# Patient Record
Sex: MALE | Race: WHITE | NOT HISPANIC OR LATINO | Employment: FULL TIME | URBAN - METROPOLITAN AREA
[De-identification: names, ages, dates, MRNs, and addresses within clinical notes are randomized per-mention and may not be internally consistent; named-entity substitution may affect disease eponyms.]

---

## 2019-08-06 ENCOUNTER — TELEPHONE (OUTPATIENT)
Dept: UROLOGY | Facility: CLINIC | Age: 44
End: 2019-08-06

## 2019-08-06 NOTE — TELEPHONE ENCOUNTER
Message left for pt on voicemail informing pt appt needed to be rescheduled.  would not be available. Please call back to r/s appt. Contact info given.

## 2019-08-22 ENCOUNTER — OFFICE VISIT (OUTPATIENT)
Dept: UROLOGY | Facility: CLINIC | Age: 44
End: 2019-08-22
Payer: COMMERCIAL

## 2019-08-22 VITALS
HEIGHT: 71 IN | BODY MASS INDEX: 20.99 KG/M2 | WEIGHT: 149.94 LBS | HEART RATE: 62 BPM | SYSTOLIC BLOOD PRESSURE: 135 MMHG | DIASTOLIC BLOOD PRESSURE: 84 MMHG

## 2019-08-22 DIAGNOSIS — E29.1 TESTICULAR FAILURE: Primary | ICD-10-CM

## 2019-08-22 PROCEDURE — 99204 PR OFFICE/OUTPT VISIT, NEW, LEVL IV, 45-59 MIN: ICD-10-PCS | Mod: S$GLB,,, | Performed by: UROLOGY

## 2019-08-22 PROCEDURE — 3008F BODY MASS INDEX DOCD: CPT | Mod: CPTII,S$GLB,, | Performed by: UROLOGY

## 2019-08-22 PROCEDURE — 3008F PR BODY MASS INDEX (BMI) DOCUMENTED: ICD-10-PCS | Mod: CPTII,S$GLB,, | Performed by: UROLOGY

## 2019-08-22 PROCEDURE — 99204 OFFICE O/P NEW MOD 45 MIN: CPT | Mod: S$GLB,,, | Performed by: UROLOGY

## 2019-08-22 PROCEDURE — 99999 PR PBB SHADOW E&M-EST. PATIENT-LVL III: CPT | Mod: PBBFAC,,, | Performed by: UROLOGY

## 2019-08-22 PROCEDURE — 99999 PR PBB SHADOW E&M-EST. PATIENT-LVL III: ICD-10-PCS | Mod: PBBFAC,,, | Performed by: UROLOGY

## 2019-08-22 NOTE — PROGRESS NOTES
"Chief Complaint:  Infertility    HPI:    Mr. Babrer is a 43 y.o.  male who has been  to his wife for the past 2 years. They have been trying to achieve a pregnancy for the past 1 years but without success. Edson Barber has not undergone a semen analysis. He denies a history of erectile dysfunction and ejaculatory problems.    He has achieved 0 pregnancies in the past.    Rosa Barber is 37 years old. ( 81) Her menses are regular. She has not undergone prior hysterosalpingogram. She has achieved 1 prior pregnancies.  She sees Martita Jordan NP.    The couple has not undergone prior intrauterine insemination procedures.    The couple has not undergone prior in-vitro fertilization procedures.    Edson Barber denies a history of exposure to harmful chemicals, toxins, and radiation.    No history of recent fevers greater than 101.5 degrees Farenheit.    No history of recent exposure to "wet heat."    No history of urological trauma or testicular torsion.    No history of prostatitis, epididymitis, and orchitis.    No history of post-pubertal mumps.    There is no known family history of fertility problems.    REVIEW OF SYSTEMS:     He denies headache, blurred vision, fever, nausea, vomiting, chills, flank discomfort, abdominal pain, bleeding per rectum, cough, SOB, recent loss of consciousness, recent mental status changes, seizures, dizziness, or upper/lower extremity weakness.    PHYSICAL EXAM:     The patient generally appears in good health, is appropriately interactive, and is in no apparent distress.     Eyes: anicteric sclerae, moist conjunctivae; no lid-lag; PERRLA     HENT: Atraumatic; oropharynx clear with moist mucous membranes and no mucosal ulcerations;normal hard and soft palate.  No evidence of lymphadenopathy.    Neck: Trachea midline.  No thyromegaly.    Musculoskeletal: No abnormal gait.    Skin: No lesions.    Mental: Cooperative with normal affect.  Is oriented to time, place, " "and person.    Neuro: Grossly intact.    Chest: Normal inspiratory effort.   No accessory muscles.  No audible wheezes.  Respirations symmetric on inspiration and expiration.    Heart: Regular rhythm.      Abdomen:  Soft, non-tender. No masses or organomegaly. Bladder is not palpable. No evidence of flank discomfort. No evidence of inguinal hernia.    Genitourinary: Penis is normal with no evidence of plaques or induration. Urethral meatus is normal. Scrotum is normal. Testes are descended bilaterally with no evidence of abnormal masses or tenderness. Epididymis, vas deferens, and cord structures are normal bilaterally.  Testicular volume is approximately 7 cc bilaterally.    Extremities: No cyanosis, clubbing, or edema.    IMPRESSION & PLAN:      Mr. Barber is a 43 y.o.  male who has been  to his wife for the past 2 years. They have been trying to achieve a pregnancy for the past 1 years but without success. Edson Barber has not undergone a semen analysis. He denies a history of erectile dysfunction and ejaculatory problems.    He has achieved 0 pregnancies in the past.    1.  FSH, LH, testosterone, prolactin, and estradiol serum levels today.  2.  Semen analysis x 2.  3.  Return to the clinic in 3 weeks to discuss test results and treatment plan.  4.  Recommend avoiding "wet heat."  5.  Recommend taking a multivitamin and 500 mg of vitamin c daily in addition to the multivitamin.  6.  Please send a copy of the note to Julian.  Thank you for the consultation.    CC: Julian    "

## 2019-08-22 NOTE — LETTER
August 22, 2019        Martita Jordan, GAETANO  [address]  [address]  [facility] [address]             [facility] - Urology 4th Floor  [address]  [place] [address]  Phone: [phone]   Patient: Edson Barber   MR Number: [id number]   YOB: 1975   Date of Visit: 8/22/2019       Dear Dr. Jordan:    Thank you for referring Edson Barber to me for evaluation. Attached you will find relevant portions of my assessment and plan of care.    If you have questions, please do not hesitate to call me. I look forward to following Edson Barber along with you.    Sincerely,      Rebel Betancur MD            CC  No Recipients    Enclosure

## 2019-08-26 ENCOUNTER — TELEPHONE (OUTPATIENT)
Dept: UROLOGY | Facility: CLINIC | Age: 44
End: 2019-08-26

## 2019-08-26 ENCOUNTER — PATIENT MESSAGE (OUTPATIENT)
Dept: UROLOGY | Facility: CLINIC | Age: 44
End: 2019-08-26

## 2019-08-26 DIAGNOSIS — R79.89 ELEVATED PROLACTIN LEVEL: Primary | ICD-10-CM

## 2019-08-28 ENCOUNTER — TELEPHONE (OUTPATIENT)
Dept: ENDOCRINOLOGY | Facility: CLINIC | Age: 44
End: 2019-08-28

## 2019-08-28 DIAGNOSIS — E22.1 HYPERPROLACTINEMIA: Primary | ICD-10-CM

## 2019-09-05 ENCOUNTER — TELEPHONE (OUTPATIENT)
Dept: ENDOCRINOLOGY | Facility: CLINIC | Age: 44
End: 2019-09-05

## 2019-09-19 ENCOUNTER — OFFICE VISIT (OUTPATIENT)
Dept: OPTOMETRY | Facility: CLINIC | Age: 44
End: 2019-09-19
Payer: COMMERCIAL

## 2019-09-19 DIAGNOSIS — Z46.0 FITTING AND ADJUSTMENT OF SPECTACLES AND CONTACT LENSES: Primary | ICD-10-CM

## 2019-09-19 DIAGNOSIS — Z04.9 DISEASE RULED OUT AFTER EXAMINATION: ICD-10-CM

## 2019-09-19 DIAGNOSIS — H52.13 MYOPIA, BILATERAL: Primary | ICD-10-CM

## 2019-09-19 PROCEDURE — 99999 PR PBB SHADOW E&M-EST. PATIENT-LVL II: ICD-10-PCS | Mod: PBBFAC,,, | Performed by: OPTOMETRIST

## 2019-09-19 PROCEDURE — 92015 DETERMINE REFRACTIVE STATE: CPT | Mod: S$GLB,,, | Performed by: OPTOMETRIST

## 2019-09-19 PROCEDURE — 99999 PR PBB SHADOW E&M-EST. PATIENT-LVL I: CPT | Mod: PBBFAC,,, | Performed by: OPTOMETRIST

## 2019-09-19 PROCEDURE — 99999 PR PBB SHADOW E&M-EST. PATIENT-LVL II: CPT | Mod: PBBFAC,,, | Performed by: OPTOMETRIST

## 2019-09-19 PROCEDURE — 99999 PR PBB SHADOW E&M-EST. PATIENT-LVL I: ICD-10-PCS | Mod: PBBFAC,,, | Performed by: OPTOMETRIST

## 2019-09-19 PROCEDURE — 92004 COMPRE OPH EXAM NEW PT 1/>: CPT | Mod: S$GLB,,, | Performed by: OPTOMETRIST

## 2019-09-19 PROCEDURE — 92015 PR REFRACTION: ICD-10-PCS | Mod: S$GLB,,, | Performed by: OPTOMETRIST

## 2019-09-19 PROCEDURE — 92004 PR EYE EXAM, NEW PATIENT,COMPREHESV: ICD-10-PCS | Mod: S$GLB,,, | Performed by: OPTOMETRIST

## 2019-09-19 PROCEDURE — 92310 CONTACT LENS FITTING OU: CPT | Mod: CSM,,, | Performed by: OPTOMETRIST

## 2019-09-19 PROCEDURE — 92310 PR CONTACT LENS FITTING (NO CHANGE): ICD-10-PCS | Mod: CSM,,, | Performed by: OPTOMETRIST

## 2019-09-19 NOTE — PROGRESS NOTES
HPI     Last eye exam was approximately 1 years ago.      Pt here for annual and cl fit  Pt states no changes in va  Last optometrits does not cover ins. Needs new drJacinto  Patient denies diplopia, headaches, flashes/floaters, pain, and   itching/burning/tearing.    Pt does not use any eye drops.        Last edited by Fabby Lindsey on 9/19/2019  8:48 AM. (History)            Assessment /Plan     For exam results, see Encounter Report.    Myopia, bilateral    Disease ruled out after examination      Rx specs  CL fit today: dispensed trials of oasys -2.00 OU  Remove nightly, replace 2 weeks  Call if need to go back to -2.25    (+) family history of mac degen in grandmother  Good overall ocular health today, monitor yearly    RTC 1 year

## 2019-09-25 ENCOUNTER — LAB VISIT (OUTPATIENT)
Dept: LAB | Facility: HOSPITAL | Age: 44
End: 2019-09-25
Attending: INTERNAL MEDICINE
Payer: COMMERCIAL

## 2019-09-25 DIAGNOSIS — E22.1 HYPERPROLACTINEMIA: ICD-10-CM

## 2019-09-25 LAB
ALBUMIN SERPL BCP-MCNC: 4.3 G/DL (ref 3.5–5.2)
ALP SERPL-CCNC: 38 U/L (ref 55–135)
ALT SERPL W/O P-5'-P-CCNC: 12 U/L (ref 10–44)
ANION GAP SERPL CALC-SCNC: 10 MMOL/L (ref 8–16)
AST SERPL-CCNC: 15 U/L (ref 10–40)
BILIRUB SERPL-MCNC: 0.4 MG/DL (ref 0.1–1)
BUN SERPL-MCNC: 16 MG/DL (ref 6–20)
CALCIUM SERPL-MCNC: 9.8 MG/DL (ref 8.7–10.5)
CHLORIDE SERPL-SCNC: 106 MMOL/L (ref 95–110)
CO2 SERPL-SCNC: 25 MMOL/L (ref 23–29)
CORTIS SERPL-MCNC: 18.1 UG/DL (ref 4.3–22.4)
CREAT SERPL-MCNC: 1.1 MG/DL (ref 0.5–1.4)
EST. GFR  (AFRICAN AMERICAN): >60 ML/MIN/1.73 M^2
EST. GFR  (NON AFRICAN AMERICAN): >60 ML/MIN/1.73 M^2
FSH SERPL-ACNC: 43 MIU/ML (ref 0.95–11.95)
GLUCOSE SERPL-MCNC: 101 MG/DL (ref 70–110)
LH SERPL-ACNC: 12.7 MIU/ML (ref 0.6–12.1)
POTASSIUM SERPL-SCNC: 5 MMOL/L (ref 3.5–5.1)
PROLACTIN SERPL IA-MCNC: 12.6 NG/ML (ref 3.5–19.4)
PROT SERPL-MCNC: 7.8 G/DL (ref 6–8.4)
SODIUM SERPL-SCNC: 141 MMOL/L (ref 136–145)
T4 FREE SERPL-MCNC: 1.1 NG/DL (ref 0.71–1.51)
TESTOST SERPL-MCNC: 433 NG/DL (ref 304–1227)
TSH SERPL DL<=0.005 MIU/L-ACNC: 1.37 UIU/ML (ref 0.4–4)

## 2019-09-25 PROCEDURE — 82533 TOTAL CORTISOL: CPT

## 2019-09-25 PROCEDURE — 83001 ASSAY OF GONADOTROPIN (FSH): CPT

## 2019-09-25 PROCEDURE — 36415 COLL VENOUS BLD VENIPUNCTURE: CPT

## 2019-09-25 PROCEDURE — 84403 ASSAY OF TOTAL TESTOSTERONE: CPT

## 2019-09-25 PROCEDURE — 84443 ASSAY THYROID STIM HORMONE: CPT

## 2019-09-25 PROCEDURE — 84305 ASSAY OF SOMATOMEDIN: CPT

## 2019-09-25 PROCEDURE — 83002 ASSAY OF GONADOTROPIN (LH): CPT

## 2019-09-25 PROCEDURE — 80053 COMPREHEN METABOLIC PANEL: CPT

## 2019-09-25 PROCEDURE — 84146 ASSAY OF PROLACTIN: CPT

## 2019-09-25 PROCEDURE — 82024 ASSAY OF ACTH: CPT

## 2019-09-25 PROCEDURE — 84439 ASSAY OF FREE THYROXINE: CPT

## 2019-09-27 LAB — ACTH PLAS-MCNC: 28 PG/ML (ref 0–46)

## 2019-10-01 LAB
IGF-I SERPL-MCNC: 170 NG/ML (ref 44–275)
IGF-I Z-SCORE SERPL: 0.67 SD

## 2019-10-08 ENCOUNTER — OFFICE VISIT (OUTPATIENT)
Dept: ENDOCRINOLOGY | Facility: CLINIC | Age: 44
End: 2019-10-08
Payer: COMMERCIAL

## 2019-10-08 VITALS
DIASTOLIC BLOOD PRESSURE: 86 MMHG | BODY MASS INDEX: 20.97 KG/M2 | HEART RATE: 54 BPM | WEIGHT: 150.38 LBS | TEMPERATURE: 98 F | SYSTOLIC BLOOD PRESSURE: 127 MMHG

## 2019-10-08 DIAGNOSIS — E22.1 HYPERPROLACTINEMIA: Primary | ICD-10-CM

## 2019-10-08 DIAGNOSIS — E29.1 PRIMARY HYPOGONADISM IN MALE: ICD-10-CM

## 2019-10-08 DIAGNOSIS — N62 GYNECOMASTIA: ICD-10-CM

## 2019-10-08 PROCEDURE — 99999 PR PBB SHADOW E&M-EST. PATIENT-LVL III: ICD-10-PCS | Mod: PBBFAC,,, | Performed by: INTERNAL MEDICINE

## 2019-10-08 PROCEDURE — 99999 PR PBB SHADOW E&M-EST. PATIENT-LVL III: CPT | Mod: PBBFAC,,, | Performed by: INTERNAL MEDICINE

## 2019-10-08 PROCEDURE — 99244 PR OFFICE CONSULTATION,LEVEL IV: ICD-10-PCS | Mod: S$GLB,,, | Performed by: INTERNAL MEDICINE

## 2019-10-08 PROCEDURE — 99244 OFF/OP CNSLTJ NEW/EST MOD 40: CPT | Mod: S$GLB,,, | Performed by: INTERNAL MEDICINE

## 2019-10-08 RX ORDER — ASCORBIC ACID 500 MG
500 TABLET ORAL DAILY
COMMUNITY

## 2019-10-08 NOTE — Clinical Note
Thank you for sending him our way. This is a bit unusual with elevated prolactin and LH/FSH which is not typical pattern so I am doing some further evaluation. I also wanted to confirm you found nothing concerning on testicular exam. He told me he passed out and preferred to defer this exam again with meThank david,Nanda

## 2019-10-08 NOTE — PROGRESS NOTES
Edson Barber is a 43 y.o. male referred by Dr. Rebel Betancur for evaluation of hyperprolactinemia, elevated LH/FSH in setting of infertility    History of Present Illness  For the past year and a half he and his wife have been trying to conceive without success  She has child from previous relationship, he has no children  Seen by Dr. Betancur who did evaluation which revealed elevated prolactin of 100 along with low-normal testosterone with elevated LH/FSH (at 1700)  Semen analysis deferred until after this visit    Normal pubertal development  Shaves 7  times a week, unchanged  Reproductive history: no children    Libido: normal.  Fatigue: some with work but not unexpected.  Erections: normal  Body hair: unchanged.  Muscle strength: normal.  Sense of smell:normal  Sleep apnea:denies  Liver disease:denies    Headaches: denies  Loss of peripheral vision: denies  Galactorrhea: denies  Some breast tenderness over past month left breast  Denies excess breast tissue    He denies use of anabolic steroids/testosterone, pelvic trauma or surgery, head trauma    Notes he is very squeamish, had LOC during recent testicular exam            Current Outpatient Medications:     ascorbic acid, vitamin C, (VITAMIN C) 500 MG tablet, Take 500 mg by mouth once daily., Disp: , Rfl:     multivitamin capsule, Take 1 capsule by mouth once daily., Disp: , Rfl:     Review of Systems   Constitutional: Negative for fever and unexpected weight change.   Eyes: Negative for pain.   Respiratory: Negative for shortness of breath.    Cardiovascular: Negative for chest pain and leg swelling.   Gastrointestinal: Negative for abdominal pain.   Genitourinary: Negative for dysuria.   Musculoskeletal: Negative for arthralgias.   Skin: Negative for rash.   Neurological: Negative for headaches.   Psychiatric/Behavioral: Negative for confusion.       Objective:     Vitals:    10/08/19 1325   BP: 127/86   Pulse: (!) 54   Temp: 97.8 °F (36.6 °C)     Wt  Readings from Last 3 Encounters:   10/08/19 68.2 kg (150 lb 5.7 oz)   08/22/19 68 kg (149 lb 14.6 oz)     Physical Exam   Constitutional: He is oriented to person, place, and time. He appears well-developed and well-nourished.   HENT:   Head: Normocephalic.   Right Ear: External ear normal.   Left Ear: External ear normal.   Mouth/Throat: Oropharynx is clear and moist.   Eyes: Pupils are equal, round, and reactive to light. Conjunctivae and EOM are normal.   Neck: No tracheal deviation present. No thyromegaly present.   Cardiovascular: Normal rate and regular rhythm.   No murmur heard.  No LE edema   Pulmonary/Chest: Effort normal and breath sounds normal.   Small amount of excess tissue L breast not tender on exam today  No excess R breast   Abdominal: Soft. He exhibits no distension and no mass. There is no tenderness. No hernia.   Genitourinary:   Genitourinary Comments: Deferred testicular exam given LOC during exam with Dr. Betancur   Neurological: He is alert and oriented to person, place, and time.   Skin: Skin is warm. No rash noted.   No nodules   Psychiatric: He has a normal mood and affect. Judgment normal.       Labs    Chemistry        Component Value Date/Time     09/25/2019 0811    K 5.0 09/25/2019 0811     09/25/2019 0811    CO2 25 09/25/2019 0811    BUN 16 09/25/2019 0811    CREATININE 1.1 09/25/2019 0811     09/25/2019 0811        Component Value Date/Time    CALCIUM 9.8 09/25/2019 0811    ALKPHOS 38 (L) 09/25/2019 0811    AST 15 09/25/2019 0811    ALT 12 09/25/2019 0811    BILITOT 0.4 09/25/2019 0811    ESTGFRAFRICA >60 09/25/2019 0811    EGFRNONAA >60 09/25/2019 0811        Component      Latest Ref Rng & Units 9/25/2019 8/22/2019   Somatomedin (IGF-I)      44 - 275 ng/mL 170    Z Score      -2.0 - 2.0 SD 0.67    Testosterone, Total      304 - 1227 ng/dL 433 357   LH      0.6 - 12.1 mIU/mL 12.7 (H) 17.9 (H)   FSH      0.95 - 11.95 mIU/mL 43.00 (H) 47.70 (H)   Estradiol      11 -  44 pg/mL  19   Prolactin      3.5 - 19.4 ng/mL 12.6 100.6 (H)   TSH      0.400 - 4.000 uIU/mL 1.366    ACTH      0 - 46 pg/mL 28    Cortisol -8 AM      4.30 - 22.40 ug/dL 18.10    Free T4      0.71 - 1.51 ng/dL 1.10          Assessment and Plan     Hyperprolactinemia  Elevated to 100 on initial check with normalization on next lab  No symptoms suggestive of hyperprolactinemia and LH/FSH elevated rather than low as would be expected which is unusual  Will repeat level with next labs and if again elevated pursue pituitary imaging  Low suspicion that this is cause of infertility given elevated LH/FSH    Primary hypogonadism in male  Total T normal but will check T panel to further evaluate given elevation in LH/FSH  Also with mild gynecomastia which can be seen in primary hypogonadism  Pending result consider need for further evaluation including karyotype    Gynecomastia  Mild increase in breast tissue with tenderness on left  Evaluation for hypogonadism, elevated prolactin as above  Estradiol, thyroid function normal  Will also check hCG with next labs        RTC 4 months    Nanda Cunningham MD

## 2019-10-08 NOTE — LETTER
October 9, 2019      Rebel Betancur MD  [address]  [facility] [address]           [address]  [address]  [facility] [address]  Phone: [phone]  Fax: [phone]          Patient: Edson Barber   MR Number: [id number]   YOB: 1975   Date of Visit: 10/8/2019       Dear Dr. Rebel Betancur:    Thank you for referring Edson Barber to me for evaluation. Attached you will find relevant portions of my assessment and plan of care.    If you have questions, please do not hesitate to call me. I look forward to following Edson Barber along with you.    Sincerely,    Nanda Cunningham MD    Enclosure  CC:  No Recipients    If you would like to receive this communication electronically, please contact [email or web address] or [phone] to request more information on [employer] access.    For providers and/or their staff who would like to refer a patient to [facility], please contact us through our one-stop-shop provider referral line, [facility], at [phone].    If you feel you have received this communication in error or would no longer like to receive these types of communications, please e-mail [email or web address]

## 2019-10-09 PROBLEM — E29.1 PRIMARY HYPOGONADISM IN MALE: Status: ACTIVE | Noted: 2019-10-09

## 2019-10-09 PROBLEM — N62 GYNECOMASTIA: Status: ACTIVE | Noted: 2019-10-09

## 2019-10-09 PROBLEM — E22.1 HYPERPROLACTINEMIA: Status: ACTIVE | Noted: 2019-10-09

## 2019-10-09 NOTE — ASSESSMENT & PLAN NOTE
Elevated to 100 on initial check with normalization on next lab  No symptoms suggestive of hyperprolactinemia and LH/FSH elevated rather than low as would be expected which is unusual  Will repeat level with next labs and if again elevated pursue pituitary imaging  Low suspicion that this is cause of infertility given elevated LH/FSH

## 2019-10-09 NOTE — ASSESSMENT & PLAN NOTE
Total T normal but will check T panel to further evaluate given elevation in LH/FSH  Also with mild gynecomastia which can be seen in primary hypogonadism  Pending result consider need for further evaluation including karyotype

## 2019-10-09 NOTE — ASSESSMENT & PLAN NOTE
Mild increase in breast tissue with tenderness on left  Evaluation for hypogonadism, elevated prolactin as above  Estradiol, thyroid function normal  Will also check hCG with next labs

## 2019-10-16 ENCOUNTER — LAB VISIT (OUTPATIENT)
Dept: LAB | Facility: HOSPITAL | Age: 44
End: 2019-10-16
Attending: INTERNAL MEDICINE
Payer: COMMERCIAL

## 2019-10-16 DIAGNOSIS — E22.1 HYPERPROLACTINEMIA: ICD-10-CM

## 2019-10-16 DIAGNOSIS — E29.1 PRIMARY HYPOGONADISM IN MALE: ICD-10-CM

## 2019-10-16 DIAGNOSIS — N62 GYNECOMASTIA: ICD-10-CM

## 2019-10-16 LAB
FSH SERPL-ACNC: 45.5 MIU/ML (ref 0.95–11.95)
HCG INTACT+B SERPL-ACNC: <1.2 MIU/ML
LH SERPL-ACNC: 14.4 MIU/ML (ref 0.6–12.1)
PROLACTIN SERPL IA-MCNC: 18.8 NG/ML (ref 3.5–19.4)

## 2019-10-16 PROCEDURE — 83002 ASSAY OF GONADOTROPIN (LH): CPT

## 2019-10-16 PROCEDURE — 88262 CHROMOSOME ANALYSIS 15-20: CPT

## 2019-10-16 PROCEDURE — 84146 ASSAY OF PROLACTIN: CPT

## 2019-10-16 PROCEDURE — 88230 TISSUE CULTURE LYMPHOCYTE: CPT

## 2019-10-16 PROCEDURE — 84702 CHORIONIC GONADOTROPIN TEST: CPT

## 2019-10-16 PROCEDURE — 88237 TISSUE CULTURE BONE MARROW: CPT

## 2019-10-16 PROCEDURE — 84270 ASSAY OF SEX HORMONE GLOBUL: CPT

## 2019-10-16 PROCEDURE — 82040 ASSAY OF SERUM ALBUMIN: CPT

## 2019-10-16 PROCEDURE — 83001 ASSAY OF GONADOTROPIN (FSH): CPT

## 2019-10-18 ENCOUNTER — TELEPHONE (OUTPATIENT)
Dept: ENDOCRINOLOGY | Facility: CLINIC | Age: 44
End: 2019-10-18

## 2019-10-18 LAB
CHROM BANDING METHOD: NORMAL
CHROMOSOME ANALYSIS BL RELEASED BY: NORMAL
CHROMOSOME ANALYSIS BL RESULT SUMMARY: NORMAL
CLINICAL CYTOGENETICIST REVIEW: NORMAL
KARYOTYP BLD/T: NORMAL
KARYOTYP BLD/T: NORMAL
REASON FOR REFERRAL, CHROMOSOME BLOOD: NORMAL
REF LAB TEST METHOD: NORMAL
SPECIMEN SOURCE: NORMAL
SPECIMEN TYPE, CHROMOSOME BLOOD: NORMAL

## 2019-10-18 NOTE — TELEPHONE ENCOUNTER
----- Message from Ava Ruiz sent at 10/18/2019  2:52 PM CDT -----  Contact: Miracle genetic councelor heladio/ [facility]  Needs Medical Advice    Who Called: Miracle is calling to speak to someone regarding orders received for above patient. Please contact Miracle leija/ [facility] to clarify order.     Best Call Back Number: [phone]

## 2019-10-22 ENCOUNTER — PATIENT MESSAGE (OUTPATIENT)
Dept: ENDOCRINOLOGY | Facility: CLINIC | Age: 44
End: 2019-10-22

## 2019-10-22 LAB
ALBUMIN SERPL-MCNC: 4.5 G/DL (ref 3.6–5.1)
SHBG SERPL-SCNC: 44 NMOL/L (ref 10–50)
TESTOST FREE SERPL-MCNC: 39.5 PG/ML (ref 46–224)
TESTOST SERPL-MCNC: 387 NG/DL (ref 250–1100)
TESTOSTERONE.FREE+WB SERPL-MCNC: 81.3 NG/DL (ref 110–575)

## 2019-10-27 ENCOUNTER — PATIENT MESSAGE (OUTPATIENT)
Dept: UROLOGY | Facility: CLINIC | Age: 44
End: 2019-10-27

## 2019-10-28 ENCOUNTER — PATIENT MESSAGE (OUTPATIENT)
Dept: ENDOCRINOLOGY | Facility: CLINIC | Age: 44
End: 2019-10-28

## 2019-10-30 LAB
CHROM BANDING METHOD: NORMAL
CHROMOSOME ANALYSIS CONGENITAL ADDITIONAL INFORMATION: NORMAL
CHROMOSOME ANALYSIS CONGENITAL RELEASED BY: NORMAL
CHROMOSOME ANALYSIS CONGENITAL RESULT SUMMARY: NORMAL
CLINICAL CYTOGENETICIST REVIEW: NORMAL
KARYOTYP SPEC: NORMAL
REASON FOR REFERRAL, CHROMOSOME ANALYSIS CONGENITAL: NORMAL
REF LAB TEST METHOD: NORMAL
SPECIMEN SOURCE: NORMAL
SPECIMEN, CHROMOSOME ANALYSIS CONGENITAL: NORMAL

## 2019-11-01 ENCOUNTER — PATIENT MESSAGE (OUTPATIENT)
Dept: ENDOCRINOLOGY | Facility: CLINIC | Age: 44
End: 2019-11-01

## 2019-11-01 ENCOUNTER — TELEPHONE (OUTPATIENT)
Dept: ENDOCRINOLOGY | Facility: CLINIC | Age: 44
End: 2019-11-01

## 2019-11-01 NOTE — TELEPHONE ENCOUNTER
Tried to call to discuss results of karyotype. No answer, left VM and will also send message via portal

## 2019-11-01 NOTE — TELEPHONE ENCOUNTER
----- Message from Emilee Yeboah sent at 11/1/2019  1:51 PM CDT -----  Contact: pt   Type:  Patient Returning Call    Who Called: pt   Who Left Message for Patient:  Dr. Cunningham  Does the patient know what this is regarding?: result  Would the patient rather a call back or a response via [facility]? Call   Best Call Back Number: [phone]  Additional Information:

## 2019-11-11 ENCOUNTER — PATIENT MESSAGE (OUTPATIENT)
Dept: UROLOGY | Facility: CLINIC | Age: 44
End: 2019-11-11

## 2019-12-03 ENCOUNTER — PATIENT MESSAGE (OUTPATIENT)
Dept: UROLOGY | Facility: CLINIC | Age: 44
End: 2019-12-03

## 2019-12-11 ENCOUNTER — OFFICE VISIT (OUTPATIENT)
Dept: UROLOGY | Facility: CLINIC | Age: 44
End: 2019-12-11
Payer: COMMERCIAL

## 2019-12-11 ENCOUNTER — TELEPHONE (OUTPATIENT)
Dept: UROLOGY | Facility: CLINIC | Age: 44
End: 2019-12-11

## 2019-12-11 VITALS
HEIGHT: 71 IN | DIASTOLIC BLOOD PRESSURE: 77 MMHG | WEIGHT: 156.75 LBS | BODY MASS INDEX: 21.94 KG/M2 | HEART RATE: 60 BPM | SYSTOLIC BLOOD PRESSURE: 130 MMHG

## 2019-12-11 DIAGNOSIS — E29.1 TESTICULAR FAILURE: Primary | ICD-10-CM

## 2019-12-11 DIAGNOSIS — Z11.3 SCREENING FOR STDS (SEXUALLY TRANSMITTED DISEASES): Primary | ICD-10-CM

## 2019-12-11 DIAGNOSIS — Z11.3 SCREENING FOR STDS (SEXUALLY TRANSMITTED DISEASES): ICD-10-CM

## 2019-12-11 PROCEDURE — 99214 OFFICE O/P EST MOD 30 MIN: CPT | Mod: S$GLB,,, | Performed by: UROLOGY

## 2019-12-11 PROCEDURE — 3008F PR BODY MASS INDEX (BMI) DOCUMENTED: ICD-10-PCS | Mod: CPTII,S$GLB,, | Performed by: UROLOGY

## 2019-12-11 PROCEDURE — 99999 PR PBB SHADOW E&M-EST. PATIENT-LVL III: ICD-10-PCS | Mod: PBBFAC,,, | Performed by: UROLOGY

## 2019-12-11 PROCEDURE — 99999 PR PBB SHADOW E&M-EST. PATIENT-LVL III: CPT | Mod: PBBFAC,,, | Performed by: UROLOGY

## 2019-12-11 PROCEDURE — 99214 PR OFFICE/OUTPT VISIT, EST, LEVL IV, 30-39 MIN: ICD-10-PCS | Mod: S$GLB,,, | Performed by: UROLOGY

## 2019-12-11 PROCEDURE — 3008F BODY MASS INDEX DOCD: CPT | Mod: CPTII,S$GLB,, | Performed by: UROLOGY

## 2019-12-11 PROCEDURE — 87491 CHLMYD TRACH DNA AMP PROBE: CPT

## 2019-12-11 NOTE — LETTER
December 11, 2019        Jessica Sauer MD  [address]  [place] [address]             [facility] - Urology 4th Floor  [address]  [facility] [address]  Phone: [phone]   Patient: Edson Barber   MR Number: [id number]   YOB: 1975   Date of Visit: 12/11/2019       Dear Dr. Sauer:    Thank you for referring Edson Barber to me for evaluation. Attached you will find relevant portions of my assessment and plan of care.    If you have questions, please do not hesitate to call me. I look forward to following Edson Barber along with you.    Sincerely,      Rebel Betancur MD            CC  No Recipients    Enclosure

## 2019-12-11 NOTE — LETTER
December 11, 2019      Rianna Bustamante MD  [address]  [facility] [address]           [facility] - Urology 4th Floor  [address]  [place] [address]  Phone: [phone]          Patient: Edson Barber   MR Number: [id number]   YOB: 1975   Date of Visit: 12/11/2019       Dear Dr. Rianna Bustamante:    Thank you for referring Edson Barber to me for evaluation. Attached you will find relevant portions of my assessment and plan of care.    If you have questions, please do not hesitate to call me. I look forward to following Edson Barber along with you.    Sincerely,    Rebel Betancur MD    Enclosure  CC:  No Recipients    If you would like to receive this communication electronically, please contact [email or web address] or [phone] to request more information on [employer] Link access.    For providers and/or their staff who would like to refer a patient to [facility], please contact us through our one-stop-shop provider referral line, [facility], at [phone].    If you feel you have received this communication in error or would no longer like to receive these types of communications, please e-mail [email or web address]

## 2019-12-11 NOTE — PROGRESS NOTES
"Chief Complaint:  Infertility    HPI:    Mr. Barber is a 44 y.o.  male who has been  to his wife for the past 2 years. They have been trying to achieve a pregnancy for the past 1 years but without success. 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 He denies a history of erectile dysfunction and ejaculatory problems.    Karyotype is normal.    Lab Results   Component Value Date    TOTALTESTOST 433 2019    LABLH 14.4 (H) 10/16/2019    FSH 45.50 (H) 10/16/2019    ESTRADIOL 19 2019    PROLACTIN 18.8 10/16/2019     SA 10/30/19--2.0 cc/azoospermia  SA 19--2.5 cc/azoospermia    FOR REVIEW FROM PREVIOUS:    Mr. Barber is a 44 y.o.  male who has been  to his wife for the past 2 years. They have been trying to achieve a pregnancy for the past 1 years but without success. Edson Barber has not undergone a semen analysis. He denies a history of erectile dysfunction and ejaculatory problems.    He has achieved 0 pregnancies in the past.    Rosa Barber is 37 years old. ( 81) Her menses are regular. She has not undergone prior hysterosalpingogram. She has achieved 1 prior pregnancies.  She sees Martita Jordan NP.    The couple has not undergone prior intrauterine insemination procedures.    The couple has not undergone prior in-vitro fertilization procedures.    Edson Barber denies a history of exposure to harmful chemicals, toxins, and radiation.    No history of recent fevers greater than 101.5 degrees Farenheit.    No history of recent exposure to "wet heat."    No history of urological trauma or testicular torsion.    No history of prostatitis, epididymitis, and orchitis.    No history of post-pubertal mumps.    There is no known family history of fertility problems.    REVIEW OF SYSTEMS:     He denies headache, blurred vision, fever, nausea, vomiting, chills, flank discomfort, abdominal pain, bleeding per rectum, cough, SOB, recent loss of " consciousness, recent mental status changes, seizures, dizziness, or upper/lower extremity weakness.    PHYSICAL EXAM:     The patient generally appears in good health, is appropriately interactive, and is in no apparent distress.     Eyes: anicteric sclerae, moist conjunctivae; no lid-lag; PERRLA     HENT: Atraumatic; oropharynx clear with moist mucous membranes and no mucosal ulcerations;normal hard and soft palate.  No evidence of lymphadenopathy.    Neck: Trachea midline.  No thyromegaly.    Musculoskeletal: No abnormal gait.    Skin: No lesions.    Mental: Cooperative with normal affect.  Is oriented to time, place, and person.    Neuro: Grossly intact.    Chest: Normal inspiratory effort.   No accessory muscles.  No audible wheezes.  Respirations symmetric on inspiration and expiration.    Heart: Regular rhythm.      Abdomen:  Soft, non-tender. No masses or organomegaly. Bladder is not palpable. No evidence of flank discomfort. No evidence of inguinal hernia.    Genitourinary: Penis is normal with no evidence of plaques or induration. Urethral meatus is normal. Scrotum is normal. Testes are descended bilaterally with no evidence of abnormal masses or tenderness. Epididymis, vas deferens, and cord structures are normal bilaterally.  Testicular volume is approximately 7 cc bilaterally.    Extremities: No cyanosis, clubbing, or edema.    IMPRESSION & PLAN:      Mr. Barber is a 44 y.o.  male who has been  to his wife for the past 2 years. They have been trying to achieve a pregnancy for the past 1 years but without success. 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 He denies a history of erectile dysfunction and ejaculatory problems.    Lab Results   Component Value Date    TOTALTESTOST 433 09/25/2019    LABLH 14.4 (H) 10/16/2019    FSH 45.50 (H) 10/16/2019    ESTRADIOL 19 08/22/2019    PROLACTIN 18.8 10/16/2019     SA 10/30/19--2.0 cc/azoospermia  SA 11/21/19--2.5  "cc/azoospermia    1.  Went over the results of his SA and hormonal panel today.  Recommend Y chromosome microdeletion testing.  2.  Recommend TESE. Risks and benefits of epididymal sperm aspiration/TESE  were discussed in detail today including failure to retrieve sperm, infection, bleeding, hypogonadism, pain, loss of testicle, need for IVF with subsequent failure to achieve a pregnancy.   He'd like to think about this.  Will call if he elects to schedule.  3.  Recommend avoiding "wet heat."  4.  Recommend taking a multivitamin and 500 mg of vitamin c daily in addition to the multivitamin.  5.  Please send a copy of the note to Dr. Sauer.      CC: Camacho    "

## 2019-12-12 LAB
C TRACH DNA SPEC QL NAA+PROBE: NOT DETECTED
N GONORRHOEA DNA SPEC QL NAA+PROBE: NOT DETECTED

## 2021-01-22 ENCOUNTER — IMMUNIZATION (OUTPATIENT)
Dept: FAMILY MEDICINE | Facility: CLINIC | Age: 46
End: 2021-01-22
Payer: COMMERCIAL

## 2021-01-22 DIAGNOSIS — Z23 NEED FOR VACCINATION: Primary | ICD-10-CM

## 2021-01-22 PROCEDURE — 91300 COVID-19, MRNA, LNP-S, PF, 30 MCG/0.3 ML DOSE VACCINE: CPT | Mod: PBBFAC | Performed by: FAMILY MEDICINE

## 2021-02-12 ENCOUNTER — IMMUNIZATION (OUTPATIENT)
Dept: FAMILY MEDICINE | Facility: CLINIC | Age: 46
End: 2021-02-12
Payer: COMMERCIAL

## 2021-02-12 DIAGNOSIS — Z23 NEED FOR VACCINATION: Primary | ICD-10-CM

## 2021-02-12 PROCEDURE — 91300 COVID-19, MRNA, LNP-S, PF, 30 MCG/0.3 ML DOSE VACCINE: CPT | Mod: PBBFAC | Performed by: FAMILY MEDICINE

## 2021-02-12 PROCEDURE — 0002A COVID-19, MRNA, LNP-S, PF, 30 MCG/0.3 ML DOSE VACCINE: CPT | Mod: PBBFAC | Performed by: FAMILY MEDICINE

## 2021-03-18 ENCOUNTER — PATIENT MESSAGE (OUTPATIENT)
Dept: RESEARCH | Facility: HOSPITAL | Age: 46
End: 2021-03-18

## 2021-03-26 ENCOUNTER — PATIENT MESSAGE (OUTPATIENT)
Dept: RESEARCH | Facility: HOSPITAL | Age: 46
End: 2021-03-26

## 2025-03-24 ENCOUNTER — OFFICE VISIT (OUTPATIENT)
Dept: OPTOMETRY | Facility: CLINIC | Age: 50
End: 2025-03-24
Payer: COMMERCIAL

## 2025-03-24 DIAGNOSIS — Z46.0 FITTING AND ADJUSTMENT OF SPECTACLES AND CONTACT LENSES: ICD-10-CM

## 2025-03-24 DIAGNOSIS — Z97.3 WEARS CONTACT LENSES: ICD-10-CM

## 2025-03-24 DIAGNOSIS — Z46.0 FITTING AND ADJUSTMENT OF SPECTACLES AND CONTACT LENSES: Primary | ICD-10-CM

## 2025-03-24 DIAGNOSIS — H52.4 PRESBYOPIA: ICD-10-CM

## 2025-03-24 DIAGNOSIS — H52.13 MYOPIA, BILATERAL: Primary | ICD-10-CM

## 2025-03-24 PROCEDURE — 99999 PR PBB SHADOW E&M-NEW PATIENT-LVL II: CPT | Mod: PBBFAC,,, | Performed by: OPTOMETRIST

## 2025-03-24 PROCEDURE — 92310 CONTACT LENS FITTING OU: CPT | Mod: CSM,,, | Performed by: OPTOMETRIST

## 2025-03-24 PROCEDURE — 92004 COMPRE OPH EXAM NEW PT 1/>: CPT | Mod: S$GLB,,, | Performed by: OPTOMETRIST

## 2025-03-24 PROCEDURE — 92015 DETERMINE REFRACTIVE STATE: CPT | Mod: S$GLB,,, | Performed by: OPTOMETRIST

## 2025-03-24 PROCEDURE — 99999 PR PBB SHADOW E&M-EST. PATIENT-LVL II: CPT | Mod: PBBFAC,,, | Performed by: OPTOMETRIST

## 2025-03-24 NOTE — PROGRESS NOTES
HPI    Last eye exam was approximately 1 year ago.   Patient states has been trying monovision SCL's for the past 2   years-having to use readers on top of them for small print. Would be   interested in trying multifocal SCL's vs monovision.   Patient denies diplopia, headaches, flashes/floaters, and pain.    Last edited by Fabby Tony MA on 3/24/2025  1:14 PM.            Assessment /Plan     For exam results, see Encounter Report.    Myopia, bilateral   Rx specs    Presbyopia  Fitting and adjustment of spectacles and contact lenses  Wears contact lenses      Dispensed trials of oasys 1 day MF MAX OU  Remove nightly, replace daily  Ok to order if happy with vision and comfort OU    Return for adjustments if problems with visoin         RTC 1 year, DFE

## 2025-03-26 ENCOUNTER — PATIENT MESSAGE (OUTPATIENT)
Dept: OPTOMETRY | Facility: CLINIC | Age: 50
End: 2025-03-26
Payer: COMMERCIAL